# Patient Record
Sex: FEMALE | Race: WHITE
[De-identification: names, ages, dates, MRNs, and addresses within clinical notes are randomized per-mention and may not be internally consistent; named-entity substitution may affect disease eponyms.]

---

## 2022-01-10 ENCOUNTER — HOSPITAL ENCOUNTER (EMERGENCY)
Dept: HOSPITAL 63 - ER | Age: 57
LOS: 1 days | Discharge: HOME | End: 2022-01-11
Payer: COMMERCIAL

## 2022-01-10 VITALS — HEIGHT: 68 IN | BODY MASS INDEX: 25.13 KG/M2 | WEIGHT: 165.79 LBS

## 2022-01-10 DIAGNOSIS — E46: ICD-10-CM

## 2022-01-10 DIAGNOSIS — E87.6: ICD-10-CM

## 2022-01-10 DIAGNOSIS — D72.829: Primary | ICD-10-CM

## 2022-01-10 LAB
ALBUMIN SERPL-MCNC: 2.9 G/DL (ref 3.4–5)
ALP SERPL-CCNC: 65 U/L (ref 46–116)
ALT SERPL-CCNC: 17 U/L (ref 14–59)
AMPHETAMINE/METHAMPHETAMINE: (no result)
AMYLASE SERPL-CCNC: 26 U/L (ref 25–115)
ANION GAP SERPL CALC-SCNC: 9 MMOL/L (ref 6–14)
APTT PPP: YELLOW S
AST SERPL-CCNC: 11 U/L (ref 15–37)
BACTERIA #/AREA URNS HPF: 0 /HPF
BARBITURATES UR-MCNC: (no result) UG/ML
BASOPHILS # BLD AUTO: 0 X10^3/UL (ref 0–0.2)
BASOPHILS NFR BLD: 0 % (ref 0–3)
BENZODIAZ UR-MCNC: (no result) UG/L
BILIRUB DIRECT SERPL-MCNC: 0.1 MG/DL (ref 0–0.2)
BILIRUB SERPL-MCNC: 0.3 MG/DL (ref 0.2–1)
BILIRUB UR QL STRIP: (no result)
CA-I SERPL ISE-MCNC: 10 MG/DL (ref 7–20)
CALCIUM SERPL-MCNC: 8.6 MG/DL (ref 8.5–10.1)
CANNABINOIDS UR-MCNC: (no result) UG/L
CHLORIDE SERPL-SCNC: 99 MMOL/L (ref 98–107)
CO2 SERPL-SCNC: 26 MMOL/L (ref 21–32)
COCAINE UR-MCNC: (no result) NG/ML
CREAT SERPL-MCNC: 1 MG/DL (ref 0.6–1)
EOSINOPHIL NFR BLD: 0.1 X10^3/UL (ref 0–0.7)
EOSINOPHIL NFR BLD: 1 % (ref 0–3)
ERYTHROCYTE [DISTWIDTH] IN BLOOD BY AUTOMATED COUNT: 12.8 % (ref 11.5–14.5)
FIBRINOGEN PPP-MCNC: CLEAR MG/DL
GFR SERPLBLD BASED ON 1.73 SQ M-ARVRAT: 57.1 ML/MIN
GLUCOSE SERPL-MCNC: 107 MG/DL (ref 70–99)
GLUCOSE UR STRIP-MCNC: 100 MG/DL
HCT VFR BLD CALC: 38 % (ref 36–47)
HGB BLD-MCNC: 12.8 G/DL (ref 12–15.5)
LIPASE: 69 U/L (ref 73–393)
LYMPHOCYTES # BLD: 3.1 X10^3/UL (ref 1–4.8)
LYMPHOCYTES NFR BLD AUTO: 17 % (ref 24–48)
MCH RBC QN AUTO: 29 PG (ref 25–35)
MCHC RBC AUTO-ENTMCNC: 34 G/DL (ref 31–37)
MCV RBC AUTO: 86 FL (ref 79–100)
METHADONE SERPL-MCNC: (no result) NG/ML
MONO #: 1.2 X10^3/UL (ref 0–1.1)
MONOCYTES NFR BLD: 6 % (ref 0–9)
NEUT #: 14.2 X10^3UL (ref 1.8–7.7)
NEUTROPHILS NFR BLD AUTO: 76 % (ref 31–73)
NITRITE UR QL STRIP: (no result)
OPIATES UR-MCNC: (no result) NG/ML
PCP SERPL-MCNC: (no result) MG/DL
PLATELET # BLD AUTO: 380 X10^3/UL (ref 140–400)
POTASSIUM SERPL-SCNC: 3.1 MMOL/L (ref 3.5–5.1)
PROT SERPL-MCNC: 6.5 G/DL (ref 6.4–8.2)
RBC # BLD AUTO: 4.43 X10^6/UL (ref 3.5–5.4)
RBC #/AREA URNS HPF: (no result) /HPF (ref 0–2)
SODIUM SERPL-SCNC: 134 MMOL/L (ref 136–145)
SP GR UR STRIP: 1.01
SQUAMOUS #/AREA URNS LPF: (no result) /LPF
UROBILINOGEN UR-MCNC: 0.2 MG/DL
WBC # BLD AUTO: 18.7 X10^3/UL (ref 4–11)
WBC #/AREA URNS HPF: (no result) /HPF (ref 0–4)

## 2022-01-10 PROCEDURE — 96372 THER/PROPH/DIAG INJ SC/IM: CPT

## 2022-01-10 PROCEDURE — 83690 ASSAY OF LIPASE: CPT

## 2022-01-10 PROCEDURE — 80048 BASIC METABOLIC PNL TOTAL CA: CPT

## 2022-01-10 PROCEDURE — 82150 ASSAY OF AMYLASE: CPT

## 2022-01-10 PROCEDURE — 99285 EMERGENCY DEPT VISIT HI MDM: CPT

## 2022-01-10 PROCEDURE — 80076 HEPATIC FUNCTION PANEL: CPT

## 2022-01-10 PROCEDURE — 85007 BL SMEAR W/DIFF WBC COUNT: CPT

## 2022-01-10 PROCEDURE — 74022 RADEX COMPL AQT ABD SERIES: CPT

## 2022-01-10 PROCEDURE — 36415 COLL VENOUS BLD VENIPUNCTURE: CPT

## 2022-01-10 PROCEDURE — 80307 DRUG TEST PRSMV CHEM ANLYZR: CPT

## 2022-01-10 PROCEDURE — 96361 HYDRATE IV INFUSION ADD-ON: CPT

## 2022-01-10 PROCEDURE — 81001 URINALYSIS AUTO W/SCOPE: CPT

## 2022-01-10 PROCEDURE — 85025 COMPLETE CBC W/AUTO DIFF WBC: CPT

## 2022-01-10 PROCEDURE — 85730 THROMBOPLASTIN TIME PARTIAL: CPT

## 2022-01-10 PROCEDURE — 96375 TX/PRO/DX INJ NEW DRUG ADDON: CPT

## 2022-01-10 PROCEDURE — 84484 ASSAY OF TROPONIN QUANT: CPT

## 2022-01-10 PROCEDURE — 74177 CT ABD & PELVIS W/CONTRAST: CPT

## 2022-01-10 PROCEDURE — 96374 THER/PROPH/DIAG INJ IV PUSH: CPT

## 2022-01-10 PROCEDURE — 85610 PROTHROMBIN TIME: CPT

## 2022-01-10 NOTE — PHYS DOC
Past History


Past Medical History:  Other


Past Medical History


Ulcerative colitis


Past Surgical History:  No Surgical History


Smoking:  Non-smoker


Alcohol Use:  None


Drug Use:  None





General Adult


HPI:


HPI:


".. I ve got ulcerative colitis.. I been having some cramping..  I did go to the

urgent care  earlier this week end .. .. and got fluids..  and a steroid taper..

 I have a lot more bleeding today.. and cramping.."





Patient is a 56 year old female who presents with above hx and complaints of 

blood in stool.  Patient has long history of ulcerative colitis.  Has not had a 

colonoscopy in excessive 7 years.  Patient's last flare was .  Patient 

previously on Sulfasalazine.   Patient has not been on any suppressive meds.  No

recent travel.  No specific ill contacts.  Has gotten COVID vaccination.  Has 

not gotten a flu vaccination.  No recent travel.  No history of bad food intake.

 No history immunosuppression.





Review of Systems:


Review of Systems:


Constitutional:  Denies fever or chills 


Eyes:  Denies change in visual acuity 


HENT:  Denies nasal congestion or sore throat 


Respiratory:  Denies cough or shortness of breath 


Cardiovascular:  Denies chest pain or edema 


GI: Complains of abdominal pain, nausea, and some bright red blood in stools.  

Vomiting, bloody stools or diarrhea 


: Denies dysuria 


Musculoskeletal:  Denies back pain or joint pain 


Integument:  Denies rash 


Neurologic:  Denies headache, focal weakness or sensory changes 


Endocrine:  Denies polyuria or polydipsia 


Lymphatic:  Denies swollen glands 


Psychiatric:  Denies depression or anxiety





Family History:


Family History:


Noncontributory to presentation





Current Medications:


Current Meds:


See nursing for home meds





Allergies:


Allergies:





Allergies








Coded Allergies Type Severity Reaction Last Updated Verified


 


  No Known Drug Allergies    14 No











Physical Exam:


PE:





Constitutional: Moderate acute distress, non-toxic appearance. []


HENT: Normocephalic, atraumatic, bilateral external ears normal, oropharynx 

moist, no oral exudates, nose normal. []


Eyes: PERRLA, EOMI, conjunctiva normal, no discharge. [] 


Neck: Normal range of motion, no tenderness, supple, no stridor. [] 


Cardiovascular:Heart rate regular rhythm, no murmur []


Lungs & Thorax:  Bilateral breath sounds clear to auscultation []


Abdomen: Bowel sounds hyperactive, soft, generalized tenderness, no masses, no 

pulsatile masses. Generlized tenderness.


Skin: Warm, dry, no erythema, no rash. [] 


Back: No tenderness, no CVA tenderness. [] 


Extremities: No tenderness, no cyanosis, no clubbing, ROM intact, no edema. [] 


Neurologic: Alert and oriented X 3, normal motor function, normal sensory 

function, no focal deficits noted. []


Psychologic: Affect anxious, judgement normal, mood normal. []





EKG:


EKG:


[]





Radiology/Procedures:


Radiology/Procedures:


[[facility] [address], [place] [address]


                                 [phone]


                                        


                                 IMAGING REPORT





                                     Signed





PATIENT: EDA PEREZ   ACCOUNT: [id number]     MRN#: [id number]


: 1965           LOCATION: ER              AGE: 56


SEX: F                    EXAM DT: 01/10/22         ACCESSION#: [id number]


STATUS: REG ER            ORD. PHYSICIAN: BEBE YOUNG MD


REASON: UC, pain, bleeding Omni 300 75cc


PROCEDURE: CT ABD PELV W/ORAL&IV CONTRAST





CT ABDOMEN+PELVIS W





History: Reason: UC, pain, bleeding Omni 300 75cc / Spl. Instructions:  / 

History: 





Technique:  After the administration of intravenous contrast, CT imaging was 

performed of the abdomen and pelvis.  Multiplanar images are reviewed. 





Exposure: One or more of the following individualized dose reduction techniques 

were utilized for this examination:  


1. Automated exposure control  


2. Adjustment of the mA and/or kV according to patient size  


3. Use of iterative reconstruction technique.





Comparison:  May 5, 2014





Findings:


Lower chest: No consolidation or pleural effusion.





Abdomen and pelvis: Right hepatic lobe cyst measures 1.7 cm. The liver, adrenal 

glands, pancreas and gallbladder are unremarkable. No biliary ductal dilatation.

 Patent portal vein. No renal calculus. No hydronephrosis. Decompressed urinary 

bladder.





Multifocal colonic wall thickening moderate within the mid aspect and severe 

within the ascending colon. There is adjacent inflammatory changes. Normal 

appendix. No evidence of bowel obstruction. Oral contrast opacifies the distal 

small bowel. Small mesenteric and retroperitoneal lymph nodes. No ascites. No 

intra-abdominal abscess. No pneumatosis or pneumoperitoneum.





Bones: No pathologic osseous lesions.





Impression:


1.  Multifocal colonic wall thickening with adjacent inflammatory changes most 

prominent within the ascending colon, compatible with history of ulcerative 

colitis.








Electronically signed by: Caleb Echavarria DO (2022 1:29 AM) [facility]














DICTATED AND SIGNED BY:     CALEB ECHAVARRIA DO


DATE:     22





CC: BEBE YOUNG MD; PCP,NO ~MTH0 0


][facility] [address], [place] [address]


                                 [phone]


                                        


                                 IMAGING REPORT





                                     Signed





PATIENT: EDA PEREZ   ACCOUNT: [id number]     MRN#: [id number]


: 1965           LOCATION: ER              AGE: 56


SEX: F                    EXAM DT: 01/10/22         ACCESSION#: [id number]


STATUS: REG ER            ORD. PHYSICIAN: BEBE YOUNG MD


REASON: Severe abdomen pain


PROCEDURE: ACUTE ABDOMEN SERIES





XR ABDOMEN COMP ACUTE





History: Reason: Severe abdomen pain / Spl. Instructions:  / History: 





Technique: Upright and supine views the abdomen.





Comparison: None.





Findings:


No consolidation or pleural effusion. No pneumothorax. Normal heart size. No 

pneumoperitoneum. Mild small bowel gas. Air and stool scattered throughout the 

colon.





Impression: 


1.  Nonobstructed bowel gas pattern.





Electronically signed by: Caleb Echavarria DO (2022 12:13 AM) [facility]














DICTATED AND SIGNED BY:     CALEB ECHAVARRIA DO


DATE:     22





CC: BEBE YOUNG MD; PCP,NO ~MTH0 0





Heart Score:


C/O Chest Pain:  N/A


HEART Score for Chest Pain:  








HEART Score for Chest Pain Response (Comments) Value


 


History Slighlty/Non-Suspicious 0


 


ECG Normal 0


 


Age < 45 0


 


Risk Factors No Risk Factors 0


 


Troponin < Normal Limit 0


 


Total  0








Risk Factors:


Risk Factors:  DM, Current or recent (<one month) smoker, HTN, HLP, family 

history of CAD, obesity.


Risk Scores:


Score 0 - 3:  2.5% MACE over next 6 weeks - Discharge Home


Score 4 - 6:  20.3% MACE over next 6 weeks - Admit for Clinical Observation


Score 7 - 10:  72.7% MACE over next 6 weeks - Early Invasive Strategies





Course & Med Decision Making:


Course & Med Decision Making


Pertinent Labs and Imaging studies reviewed. (See chart for details)  





Pt. refused COVID and Flu swabs/





Clear fluid diet only x 48 hrs.  No solids or milk products .  Clear fluids 

only.   Take Meds as previously directed.   Follow up with GI.   Get colon 

scopic evaluation..  Start Apriso 0.375  qid.  Flagyl 500 three times a day.   

Cipro 500 twice a day x 5 days.   Zofran for active vomiting. Push simple fruit 

juices such as apple, grape, jello..ect.  Must follow up.   Get flu flu, COVID 

and Pneumovax vaccination went off the steroids





Impression:





1. Abdomen Pain


2. Hx. Ulcerative Colitis


3. [id number] 


4. Hypokalemia   3.1


5.  Malnutrition albumin 2.9





Dragon Disclaimer:


Dragon Disclaimer:


This electronic medical record was generated, in whole or in part, using a voice

recognition dictation system.





Departure


Departure:


Referrals:  


PCP,ANNIKA (PCP)


Scripts


Ondansetron (ONDANSETRON ODT) 8 Mg Tab.rapdis


8 MG PO QIDPRN for n/v, #30 TAB


   Prov: BEBE YOUNG MD         22 


Mesalamine (APRISO) 0.375 Gm Cap.er.24h


0.375 GM PO QID for Colitis for 30 Days, #120 CAP.SR


   Prov: BEBE YOUNG MD         22 


Metronidazole (FLAGYL) 375 Mg Capsule


500 MG PO TID for colitis for 10 Days, #40 CAP


   Prov: BEBE YOUNG MD         22 


Ciprofloxacin Hcl (CIPRO) 500 Mg Tablet


500 MG PO BID for colitis for 5 Days, #10 TAB


   Prov: BEBE YOUNG MD         22











BEBE YOUNG MD           Gideon 10, 2022 22:44

## 2022-01-11 VITALS — DIASTOLIC BLOOD PRESSURE: 58 MMHG | SYSTOLIC BLOOD PRESSURE: 112 MMHG

## 2022-01-11 LAB
% BANDS: 26 % (ref 0–9)
% LYMPHS: 20 % (ref 24–48)
% METAS: 4 % (ref 0–0)
% MONOS: 9 % (ref 0–10)
% SEGS: 41 % (ref 35–66)
PLATELET # BLD EST: ADEQUATE 10*3/UL

## 2022-01-11 NOTE — RAD
XR ABDOMEN COMP ACUTE



History: Reason: Severe abdomen pain / Spl. Instructions:  / History: 



Technique: Upright and supine views the abdomen.



Comparison: None.



Findings:

No consolidation or pleural effusion. No pneumothorax. Normal heart size. No pneumoperitoneum. Mild s
mall bowel gas. Air and stool scattered throughout the colon.



Impression: 

1.  Nonobstructed bowel gas pattern.



Electronically signed by: Caleb Guidry DO (1/11/2022 12:13 AM) [facility]

## 2022-01-11 NOTE — RAD
CT ABDOMEN+PELVIS W



History: Reason: UC, pain, bleeding Omni 300 75cc / Spl. Instructions:  / History: 



Technique:  After the administration of intravenous contrast, CT imaging was performed of the abdomen
 and pelvis.  Multiplanar images are reviewed. 



Exposure: One or more of the following individualized dose reduction techniques were utilized for thi
s examination:  

1. Automated exposure control  

2. Adjustment of the mA and/or kV according to patient size  

3. Use of iterative reconstruction technique.



Comparison:  May 5, 2014



Findings:

Lower chest: No consolidation or pleural effusion.



Abdomen and pelvis: Right hepatic lobe cyst measures 1.7 cm. The liver, adrenal glands, pancreas and 
gallbladder are unremarkable. No biliary ductal dilatation. Patent portal vein. No renal calculus. No
 hydronephrosis. Decompressed urinary bladder.



Multifocal colonic wall thickening moderate within the mid aspect and severe within the ascending col
on. There is adjacent inflammatory changes. Normal appendix. No evidence of bowel obstruction. Oral c
ontrast opacifies the distal small bowel. Small mesenteric and retroperitoneal lymph nodes. No ascite
s. No intra-abdominal abscess. No pneumatosis or pneumoperitoneum.



Bones: No pathologic osseous lesions.



Impression:

1.  Multifocal colonic wall thickening with adjacent inflammatory changes most prominent within the a
scending colon, compatible with history of ulcerative colitis.





Electronically signed by: Caleb Guidry DO (1/11/2022 1:29 AM) [facility]

## 2022-01-14 ENCOUNTER — HOSPITAL ENCOUNTER (EMERGENCY)
Dept: HOSPITAL 63 - ER | Age: 57
Discharge: HOME | End: 2022-01-14
Payer: COMMERCIAL

## 2022-01-14 VITALS — DIASTOLIC BLOOD PRESSURE: 64 MMHG | SYSTOLIC BLOOD PRESSURE: 116 MMHG

## 2022-01-14 VITALS — HEIGHT: 68 IN | WEIGHT: 162.04 LBS | BODY MASS INDEX: 24.56 KG/M2

## 2022-01-14 DIAGNOSIS — K51.90: Primary | ICD-10-CM

## 2022-01-14 DIAGNOSIS — D72.829: ICD-10-CM

## 2022-01-14 DIAGNOSIS — E46: ICD-10-CM

## 2022-01-14 DIAGNOSIS — E87.5: ICD-10-CM

## 2022-01-14 DIAGNOSIS — E86.0: ICD-10-CM

## 2022-01-14 LAB
% BANDS: 7 % (ref 0–9)
% LYMPHS: 19 % (ref 24–48)
% MONOS: 7 % (ref 0–10)
% SEGS: 66 % (ref 35–66)
ALBUMIN SERPL-MCNC: 2.8 G/DL (ref 3.4–5)
ALP SERPL-CCNC: 61 U/L (ref 46–116)
ALT SERPL-CCNC: 24 U/L (ref 14–59)
AMYLASE SERPL-CCNC: 27 U/L (ref 25–115)
ANION GAP SERPL CALC-SCNC: 12 MMOL/L (ref 6–14)
APTT PPP: YELLOW S
AST SERPL-CCNC: 20 U/L (ref 15–37)
BACTERIA #/AREA URNS HPF: (no result) /HPF
BASOPHILS # BLD AUTO: 0 X10^3/UL (ref 0–0.2)
BASOPHILS NFR BLD: 0 % (ref 0–3)
BILIRUB DIRECT SERPL-MCNC: 0.1 MG/DL (ref 0–0.2)
BILIRUB SERPL-MCNC: 0.4 MG/DL (ref 0.2–1)
BILIRUB UR QL STRIP: (no result)
CA-I SERPL ISE-MCNC: 7 MG/DL (ref 7–20)
CALCIUM SERPL-MCNC: 8.2 MG/DL (ref 8.5–10.1)
CHLORIDE SERPL-SCNC: 96 MMOL/L (ref 98–107)
CO2 SERPL-SCNC: 26 MMOL/L (ref 21–32)
CREAT SERPL-MCNC: 1.2 MG/DL (ref 0.6–1)
EOSINOPHIL NFR BLD AUTO: 1 % (ref 0–5)
EOSINOPHIL NFR BLD: 0.2 X10^3/UL (ref 0–0.7)
EOSINOPHIL NFR BLD: 1 % (ref 0–3)
ERYTHROCYTE [DISTWIDTH] IN BLOOD BY AUTOMATED COUNT: 13.1 % (ref 11.5–14.5)
FIBRINOGEN PPP-MCNC: CLEAR MG/DL
GFR SERPLBLD BASED ON 1.73 SQ M-ARVRAT: 46.3 ML/MIN
GLUCOSE SERPL-MCNC: 117 MG/DL (ref 70–99)
GLUCOSE UR STRIP-MCNC: 100 MG/DL
HCT VFR BLD CALC: 37.9 % (ref 36–47)
HGB BLD-MCNC: 12.7 G/DL (ref 12–15.5)
HYALINE CASTS #/AREA URNS LPF: (no result) /HPF
LIPASE: 79 U/L (ref 73–393)
LYMPHOCYTES # BLD: 2.5 X10^3/UL (ref 1–4.8)
LYMPHOCYTES NFR BLD AUTO: 10 % (ref 24–48)
MCH RBC QN AUTO: 29 PG (ref 25–35)
MCHC RBC AUTO-ENTMCNC: 34 G/DL (ref 31–37)
MCV RBC AUTO: 86 FL (ref 79–100)
MONO #: 0.7 X10^3/UL (ref 0–1.1)
MONOCYTES NFR BLD: 3 % (ref 0–9)
NEUT #: 22.5 X10^3UL (ref 1.8–7.7)
NEUTROPHILS NFR BLD AUTO: 87 % (ref 31–73)
NITRITE UR QL STRIP: (no result)
PLATELET # BLD AUTO: 336 X10^3/UL (ref 140–400)
PLATELET # BLD EST: ADEQUATE 10*3/UL
PLATELET CLUMP: PRESENT
POTASSIUM SERPL-SCNC: 2.5 MMOL/L (ref 3.5–5.1)
PROT SERPL-MCNC: 6.4 G/DL (ref 6.4–8.2)
RBC # BLD AUTO: 4.42 X10^6/UL (ref 3.5–5.4)
RBC #/AREA URNS HPF: (no result) /HPF (ref 0–2)
SODIUM SERPL-SCNC: 134 MMOL/L (ref 136–145)
SP GR UR STRIP: 1.02
SQUAMOUS #/AREA URNS LPF: (no result) /LPF
UROBILINOGEN UR-MCNC: 0.2 MG/DL
WBC # BLD AUTO: 25.9 X10^3/UL (ref 4–11)
WBC #/AREA URNS HPF: (no result) /HPF (ref 0–4)

## 2022-01-14 PROCEDURE — 85007 BL SMEAR W/DIFF WBC COUNT: CPT

## 2022-01-14 PROCEDURE — 87493 C DIFF AMPLIFIED PROBE: CPT

## 2022-01-14 PROCEDURE — 96375 TX/PRO/DX INJ NEW DRUG ADDON: CPT

## 2022-01-14 PROCEDURE — 36415 COLL VENOUS BLD VENIPUNCTURE: CPT

## 2022-01-14 PROCEDURE — 82550 ASSAY OF CK (CPK): CPT

## 2022-01-14 PROCEDURE — 85730 THROMBOPLASTIN TIME PARTIAL: CPT

## 2022-01-14 PROCEDURE — 93005 ELECTROCARDIOGRAM TRACING: CPT

## 2022-01-14 PROCEDURE — 96361 HYDRATE IV INFUSION ADD-ON: CPT

## 2022-01-14 PROCEDURE — 74022 RADEX COMPL AQT ABD SERIES: CPT

## 2022-01-14 PROCEDURE — 96376 TX/PRO/DX INJ SAME DRUG ADON: CPT

## 2022-01-14 PROCEDURE — 80048 BASIC METABOLIC PNL TOTAL CA: CPT

## 2022-01-14 PROCEDURE — 96372 THER/PROPH/DIAG INJ SC/IM: CPT

## 2022-01-14 PROCEDURE — 85025 COMPLETE CBC W/AUTO DIFF WBC: CPT

## 2022-01-14 PROCEDURE — 80076 HEPATIC FUNCTION PANEL: CPT

## 2022-01-14 PROCEDURE — 85610 PROTHROMBIN TIME: CPT

## 2022-01-14 PROCEDURE — 83690 ASSAY OF LIPASE: CPT

## 2022-01-14 PROCEDURE — 99284 EMERGENCY DEPT VISIT MOD MDM: CPT

## 2022-01-14 PROCEDURE — 82150 ASSAY OF AMYLASE: CPT

## 2022-01-14 PROCEDURE — 81001 URINALYSIS AUTO W/SCOPE: CPT

## 2022-01-14 PROCEDURE — 84484 ASSAY OF TROPONIN QUANT: CPT

## 2022-01-14 PROCEDURE — 87086 URINE CULTURE/COLONY COUNT: CPT

## 2022-01-14 PROCEDURE — 96374 THER/PROPH/DIAG INJ IV PUSH: CPT

## 2022-01-14 NOTE — RAD
XR ABDOMEN COMP ACUTE



History: Reason: nv, hx colitis / Spl. Instructions:  / History: 



Technique: Supine and upright view the abdomen.



Comparison: January 10, 2022



Findings:

No consolidation or pleural effusion. No pneumothorax. Normal heart size. No pneumoperitoneum. Mild s
mall bowel gas. Air and stool scattered throughout the colon. No air-fluid levels.



Impression: 

1.  Nonobstructed bowel gas pattern.



Electronically signed by: Caleb Guidry DO (1/14/2022 3:07 AM) [facility]

## 2022-01-14 NOTE — EKG
[facility] [address], [place] [address]

Test Date:    2022               Test Time:    03:01:11

Pat Name:     EDA PEREZ             Department:   

Patient ID:   [id number]           Room:          

Gender:       F                        Technician:   LALO

:          1965               Requested By: BEBE YOUNG

Order Number: [id number]            Reading MD:   Getachew Culver

                                 Measurements

Intervals                              Axis          

Rate:         71                       P:            39

TN:           166                      QRS:          24

QRSD:         80                       T:            33

QT:           386                                    

QTc:          420                                    

                           Interpretive Statements

SINUS RHYTHM

NORMAL ECG

RI6.02

No previous ECG available for comparison

Electronically Signed On 1- 15:53:27 CST by Getachew Culver

## 2022-01-14 NOTE — PHYS DOC
Past History


Past Medical History:  Other


Additional Past Medical Histor:  Ulcerative colitis


Past Surgical History:  No Surgical History


Smoking:  Non-smoker


Alcohol Use:  None


Drug Use:  None





General Adult


EDM:


Chief Complaint:  MULTIPLE COMPLAINTS





HPI:


HPI:


"..I am still having diarrhea..  I did not take the Flagyl or Cipro.. or 

Mesalamine..  I am still take the Prednisone..  and started Sulfasamine








Patient is a 56 year old female who presents with above hx and complaints 

abdomen pain and diarrhea.  Patient still on prednisone taper.  Has remained on 

clear fluid diet.  However is not taking Flagyl, Cipro or mesalamine.  Has plans

to start back on sulfasalamine.  Patient has known ulcerative colitis.  Presents

again tonight for IV hydration and electrolytes.  Patient states she did not 

take any of the meds prescribed because she is afraid that it would make her 

abdomen upset.





Review of Systems:


Review of Systems:


Constitutional:  Denies fever or chills 


Eyes:  Denies change in visual acuity 


HENT:  Denies nasal congestion or sore throat 


Respiratory:  Denies cough or shortness of breath 


Cardiovascular:  Denies chest pain or edema 


GI: Complains of abdominal pain, nausea, vomiting, complains of diarrhea 


: Denies dysuria 


Musculoskeletal:  Denies back pain or joint pain 


Integument:  Denies rash 


Neurologic:  Denies headache, focal weakness or sensory changes 


Endocrine:  Denies polyuria or polydipsia 


Lymphatic:  Denies swollen glands 


Psychiatric:  Denies depression or anxiety





Family History:


Family History:


Noncontributory to presentation





Current Medications:


Current Meds:


See nursing for home meds





Allergies:


Allergies:





Allergies








Coded Allergies Type Severity Reaction Last Updated Verified


 


  No Known Drug Allergies    14 No











Physical Exam:


PE:





Constitutional: Moderate acute distress, non-toxic appearance. []


HENT: Normocephalic, atraumatic, bilateral external ears normal, oropharynx 

dry,, no oral exudates, nose normal. []


Eyes: PERRLA, EOMI, conjunctiva normal, no discharge. [] 


Neck: Normal range of motion, no tenderness, supple, no stridor. [] 


Cardiovascular:Heart rate regular rhythm, no murmur [].  The bedside monitor 

shows a sinus rhythm.


Lungs & Thorax:  Bilateral breath sounds clear to auscultation []


Abdomen: Bowel sounds hyperactive, soft, generalized tenderness, no masses, no 

pulsatile masses. [] 


Skin: Warm, dry, no erythema, no rash. [] 


Back: No tenderness, no CVA tenderness. [] 


Extremities: No tenderness, no cyanosis, no clubbing, ROM intact, no edema.  No 

cording.  No Trousseau sign.


Neurologic: Alert and oriented X 3, normal motor function, normal sensory 

function, no focal deficits noted. []


Psychologic: Affect anxious, judgement normal, mood depressed.





EKG:


EKG:


My interpretation EKG shows a sinus rhythm at 71 bpm.  No acute morphology time 

of EKG is 301 hours [] No U waves noted on EKG.





Radiology/Procedures:


Radiology/Procedures:


[][facility] [address], [place] [address]


                                 [phone]


                                        


                                 IMAGING REPORT





                                     Signed





PATIENT: EDA PEREZ   ACCOUNT: [id number]     MRN#: [id number]


: 1965           LOCATION: ER              AGE: 56


SEX: F                    EXAM DT: 22         ACCESSION#: [id number]


STATUS: REG ER            ORD. PHYSICIAN: BEBE YOUNG MD


REASON: nv, hx colitis


PROCEDURE: ACUTE ABDOMEN SERIES





XR ABDOMEN COMP ACUTE





History: Reason: nv, hx colitis / Spl. Instructions:  / History: 





Technique: Supine and upright view the abdomen.





Comparison: January 10, 2022





Findings:


No consolidation or pleural effusion. No pneumothorax. Normal heart size. No 

pneumoperitoneum. Mild small bowel gas. Air and stool scattered throughout the 

colon. No air-fluid levels.





Impression: 


1.  Nonobstructed bowel gas pattern.





Electronically signed by: Caleb Guidry DO (2022 3:07 AM) [facility]














DICTATED AND SIGNED BY:     CALEB GUIDRY DO


DATE:     22 0305





CC: ILIANA BRISCOE MD; BEBE YOUNG MD ~MTH0 0





Heart Score:


C/O Chest Pain:  N/A


HEART Score for Chest Pain:  








HEART Score for Chest Pain Response (Comments) Value


 


History Slighlty/Non-Suspicious 0


 


ECG Normal 0


 


Age >45 - < 65 1


 


Risk Factors                            1 or 2 Risk Factors 1


 


Troponin < Normal Limit 0


 


Total  2








Risk Factors:


Risk Factors:  DM, Current or recent (<one month) smoker, HTN, HLP, family 

history of CAD, obesity.


Risk Scores:


Score 0 - 3:  2.5% MACE over next 6 weeks - Discharge Home


Score 4 - 6:  20.3% MACE over next 6 weeks - Admit for Clinical Observation


Score 7 - 10:  72.7% MACE over next 6 weeks - Early Invasive Strategies





Course & Med Decision Making:


Course & Med Decision Making


Pertinent Labs and Imaging studies reviewed. (See chart for details)





Patient given 2 L of LR.  Patient given Zofran IV x2.  Patient encouraged to 

stay on a clear fluid diet only for the next couple days.  No solids.  No milk 

products.  Again encourage patient to take antibiotics as directed.  Patient to 

push fluids particularly fluids high in potassium.  Patient's potassium was 

supplemented p.o.  Advised patient if further problems will need to follow the 

hospital has GI on call and possible admission.  Patient encouraged to take 

meds.  As directed.  We will add cholestyramine as a bulk agent for her stools. 

 Sample stool was sent for C. difficile.  Patient to follow-up pending culture 

for C. difficile.  The patient to follow-up GI.  Additional prescription for 

Zofran 8 mg up to 4 times a day.  Warned patient must get control of this 

ulcerative colitis.  If this continued to deteriorate she could have a 

perforation, GI bleed, or even need of colon resection.  Advised patient must 

follow with a hospital that has GI services such at [facility], [facility],.  If no

 improvement advised patient see will need to be admitted and placed on n.p.o. 

status.  With consult to GI.  Pt. at this time defers admission. 








Impression:





1.  Abdomen pain


2.  Known ulcerative colitis exacerbation


3.  Dehydration


4.  Critical hyperal kalemia 2.5


5.  Leukocytosis 25.9


6.  Malnutrition albumin 2.8





[]





Dragon Disclaimer:


Dragon Disclaimer:


This electronic medical record was generated, in whole or in part, using a voice

 recognition dictation system.





Departure


Departure:


Referrals:  


ILIANA BRISCOE MD (PCP)


Scripts


Ondansetron (ONDANSETRON ODT) 4 Mg Tab.rapdis


8 MG PO QIDPRN PRN for nv, #30 TAB


   Prov: BEBE YOUNG MD         22 


Ondansetron (ONDANSETRON ODT) 4 Mg Tab.rapdis


8 MG PO qidp for nv, #30 TAB


   Prov: BEBE YOUNG MD         22 


Cholestyramine (CHOLESTYRAMINE RESIN) 5 Gm Powder


5 GM MC TID for diarrhea for 30 Days, MISC


   Prov: BEBE YOUNG MD         22





Dragon Disclaimer


This chart was dictated in whole or in part using Voice Recognition software in 

a busy, high-work load, and often noisy Emergency Department environment.  It 

may contain unintended and wholly unrecognized errors or omissions.











BEBE YOUNG MD           2022 02:02

## 2022-01-16 ENCOUNTER — HOSPITAL ENCOUNTER (INPATIENT)
Dept: HOSPITAL 61 - ER | Age: 57
LOS: 2 days | Discharge: HOME | DRG: 387 | End: 2022-01-18
Attending: INTERNAL MEDICINE | Admitting: INTERNAL MEDICINE
Payer: COMMERCIAL

## 2022-01-16 VITALS — DIASTOLIC BLOOD PRESSURE: 64 MMHG | SYSTOLIC BLOOD PRESSURE: 110 MMHG

## 2022-01-16 VITALS — WEIGHT: 162.92 LBS | HEIGHT: 68 IN | BODY MASS INDEX: 24.69 KG/M2

## 2022-01-16 VITALS — SYSTOLIC BLOOD PRESSURE: 108 MMHG | DIASTOLIC BLOOD PRESSURE: 67 MMHG

## 2022-01-16 DIAGNOSIS — Z20.822: ICD-10-CM

## 2022-01-16 DIAGNOSIS — E87.6: ICD-10-CM

## 2022-01-16 DIAGNOSIS — E86.0: ICD-10-CM

## 2022-01-16 DIAGNOSIS — K51.90: Primary | ICD-10-CM

## 2022-01-16 DIAGNOSIS — D64.9: ICD-10-CM

## 2022-01-16 LAB
% BANDS: 12 % (ref 0–9)
% LYMPHS: 28 % (ref 24–48)
% MONOS: 1 % (ref 0–10)
% SEGS: 59 % (ref 35–66)
ALBUMIN SERPL-MCNC: 2.7 G/DL (ref 3.4–5)
ALBUMIN/GLOB SERPL: 0.6 {RATIO} (ref 1–1.7)
ALP SERPL-CCNC: 70 U/L (ref 46–116)
ALT SERPL-CCNC: 29 U/L (ref 14–59)
ANION GAP SERPL CALC-SCNC: 7 MMOL/L (ref 6–14)
AST SERPL-CCNC: 13 U/L (ref 15–37)
BASOPHILS # BLD AUTO: 0 X10^3/UL (ref 0–0.2)
BASOPHILS NFR BLD: 0 % (ref 0–3)
BILIRUB SERPL-MCNC: 0.4 MG/DL (ref 0.2–1)
BUN SERPL-MCNC: 8 MG/DL (ref 7–20)
BUN/CREAT SERPL: 7 (ref 6–20)
CALCIUM SERPL-MCNC: 8.2 MG/DL (ref 8.5–10.1)
CHLORIDE SERPL-SCNC: 99 MMOL/L (ref 98–107)
CO2 SERPL-SCNC: 30 MMOL/L (ref 21–32)
CREAT SERPL-MCNC: 1.1 MG/DL (ref 0.6–1)
EOSINOPHIL NFR BLD: 0 % (ref 0–3)
EOSINOPHIL NFR BLD: 0.1 X10^3/UL (ref 0–0.7)
ERYTHROCYTE [DISTWIDTH] IN BLOOD BY AUTOMATED COUNT: 13.2 % (ref 11.5–14.5)
GFR SERPLBLD BASED ON 1.73 SQ M-ARVRAT: 51.2 ML/MIN
GLUCOSE SERPL-MCNC: 135 MG/DL (ref 70–99)
HCT VFR BLD CALC: 39.1 % (ref 36–47)
HGB BLD-MCNC: 13.5 G/DL (ref 12–15.5)
LIPASE: 106 U/L (ref 73–393)
LYMPHOCYTES # BLD: 1.3 X10^3/UL (ref 1–4.8)
LYMPHOCYTES NFR BLD AUTO: 7 % (ref 24–48)
MAGNESIUM SERPL-MCNC: 2.1 MG/DL (ref 1.8–2.4)
MCH RBC QN AUTO: 29 PG (ref 25–35)
MCHC RBC AUTO-ENTMCNC: 34 G/DL (ref 31–37)
MCV RBC AUTO: 85 FL (ref 79–100)
MONO #: 0.7 X10^3/UL (ref 0–1.1)
MONOCYTES NFR BLD: 4 % (ref 0–9)
NEUT #: 17 X10^3/UL (ref 1.8–7.7)
NEUTROPHILS NFR BLD AUTO: 89 % (ref 31–73)
PLATELET # BLD AUTO: 375 X10^3/UL (ref 140–400)
PLATELET # BLD EST: ADEQUATE 10*3/UL
POTASSIUM SERPL-SCNC: 2.9 MMOL/L (ref 3.5–5.1)
PROT SERPL-MCNC: 7 G/DL (ref 6.4–8.2)
RBC # BLD AUTO: 4.6 X10^6/UL (ref 3.5–5.4)
SODIUM SERPL-SCNC: 136 MMOL/L (ref 136–145)
TOXIC GRANULES BLD QL SMEAR: (no result)
WBC # BLD AUTO: 19.1 X10^3/UL (ref 4–11)

## 2022-01-16 PROCEDURE — U0003 INFECTIOUS AGENT DETECTION BY NUCLEIC ACID (DNA OR RNA); SEVERE ACUTE RESPIRATORY SYNDROME CORONAVIRUS 2 (SARS-COV-2) (CORONAVIRUS DISEASE [COVID-19]), AMPLIFIED PROBE TECHNIQUE, MAKING USE OF HIGH THROUGHPUT TECHNOLOGIES AS DESCRIBED BY CMS-2020-01-R: HCPCS

## 2022-01-16 PROCEDURE — 84484 ASSAY OF TROPONIN QUANT: CPT

## 2022-01-16 PROCEDURE — 83690 ASSAY OF LIPASE: CPT

## 2022-01-16 PROCEDURE — 85007 BL SMEAR W/DIFF WBC COUNT: CPT

## 2022-01-16 PROCEDURE — 87493 C DIFF AMPLIFIED PROBE: CPT

## 2022-01-16 PROCEDURE — 90471 IMMUNIZATION ADMIN: CPT

## 2022-01-16 PROCEDURE — 90686 IIV4 VACC NO PRSV 0.5 ML IM: CPT

## 2022-01-16 PROCEDURE — 93005 ELECTROCARDIOGRAM TRACING: CPT

## 2022-01-16 PROCEDURE — 36415 COLL VENOUS BLD VENIPUNCTURE: CPT

## 2022-01-16 PROCEDURE — 80048 BASIC METABOLIC PNL TOTAL CA: CPT

## 2022-01-16 PROCEDURE — 87426 SARSCOV CORONAVIRUS AG IA: CPT

## 2022-01-16 PROCEDURE — 96368 THER/DIAG CONCURRENT INF: CPT

## 2022-01-16 PROCEDURE — 85025 COMPLETE CBC W/AUTO DIFF WBC: CPT

## 2022-01-16 PROCEDURE — 83550 IRON BINDING TEST: CPT

## 2022-01-16 PROCEDURE — 85027 COMPLETE CBC AUTOMATED: CPT

## 2022-01-16 PROCEDURE — 83631 LACTOFERRIN FECAL (QUANT): CPT

## 2022-01-16 PROCEDURE — 74018 RADEX ABDOMEN 1 VIEW: CPT

## 2022-01-16 PROCEDURE — 87505 NFCT AGENT DETECTION GI: CPT

## 2022-01-16 PROCEDURE — 80053 COMPREHEN METABOLIC PANEL: CPT

## 2022-01-16 PROCEDURE — G0378 HOSPITAL OBSERVATION PER HR: HCPCS

## 2022-01-16 PROCEDURE — 96365 THER/PROPH/DIAG IV INF INIT: CPT

## 2022-01-16 PROCEDURE — 96375 TX/PRO/DX INJ NEW DRUG ADDON: CPT

## 2022-01-16 PROCEDURE — 83735 ASSAY OF MAGNESIUM: CPT

## 2022-01-16 PROCEDURE — 82607 VITAMIN B-12: CPT

## 2022-01-16 PROCEDURE — 83540 ASSAY OF IRON: CPT

## 2022-01-16 RX ADMIN — ENOXAPARIN SODIUM SCH MG: 40 INJECTION SUBCUTANEOUS at 17:33

## 2022-01-16 RX ADMIN — POTASSIUM CHLORIDE SCH MLS/HR: 200 INJECTION, SOLUTION INTRAVENOUS at 13:05

## 2022-01-16 RX ADMIN — DEXTROSE, SODIUM CHLORIDE, AND POTASSIUM CHLORIDE SCH MLS/HR: 5; .45; .15 INJECTION INTRAVENOUS at 17:32

## 2022-01-16 RX ADMIN — POTASSIUM CHLORIDE SCH MLS/HR: 200 INJECTION, SOLUTION INTRAVENOUS at 15:13

## 2022-01-16 RX ADMIN — CIPROFLOXACIN SCH MLS/HR: 2 INJECTION, SOLUTION INTRAVENOUS at 21:00

## 2022-01-16 NOTE — RAD
PROCEDURE:  AP abdomen 1/16/2022 5:58 PM.



REASON FOR STUDY: Reason: abd pain / Spl. Instructions:  / History: .



COMPARISON: Radiographs of 1/14/2022 and CT of 1/11/2022.



FINDINGS: Gas is seen in the stomach and what may be a combination of large and small bowel. There is
 suggestion of some wall thickening of the transverse colon consistent with findings on CT. No bowel 
obstruction is seen. No abnormal masses or gas collections are apparent..



IMPRESSION: Colon wall thickening. No evidence of obstruction.



Electronically signed by: Favio Snyder Jr., MD (1/16/2022 6:00 PM) [facility]

## 2022-01-16 NOTE — PHYS DOC
General Adult


EDM:


Chief Complaint:  GI PROBLEM





HPI:


HPI:





Patient is a 56  year old female who presents with arriving at the emergency 

room with her  after being at [facility] on January 10,  and at

[facility] emergency room prior to those and diagnosed with colitis flare.  She 

has been given Flagyl and Cipro and sulfasalamine along with a prednisone taper.

 Patient is not compliant with this as she states it makes her vomit.  She 

states the ODT Zofran is also not working.  She states that she is now very 

weak, dizzy and still having abdominal pain.  She is not able to keep down 

fluids and continues to have constant watery diarrhea.  Patient is rating her 

pain 8 out of 10 at this time.  She is very tearful.  She states she recently 

lost her father and she has been very stressed.  Patient states she has not seen

a GI doctor in quite some many years because she has not had bad flares.  She 

denies blood in her vomit or stool, fever, syncope, chest pain, shortness of 

air, cough, headache.  She is vaccinated for COVID-19.





Review of Systems:


Review of Systems:


Constitutional:   Denies fever or chills. []


Eyes:   Denies change in visual acuity. []


HENT:   Denies nasal congestion or sore throat. [] 


Respiratory:   Denies cough or shortness of breath. [] 


Cardiovascular:   Denies chest pain or edema. [] 


GI:  + abdominal pain, +nausea, +vomiting, denies bloody stools or +diarrhea. []

 


:  Denies dysuria. [] 


Musculoskeletal:   Denies back pain or joint pain. + Generalized weakness [] 


Integument:   Denies rash. [] 


Neurologic:   Denies headache, focal weakness or sensory changes. [] 


Endocrine:   Denies polyuria or polydipsia. [] 


Lymphatic:  Denies swollen glands. [] 


Psychiatric:  Denies depression or anxiety. []





Heart Score:


C/O Chest Pain:  No





Current Medications:





Current Medications








 Medications


  (Trade)  Dose


 Ordered  Sig/Jacinto  Start Time


 Stop Time Status Last Admin


Dose Admin


 


 Ciprofloxacin/


 Dextrose  200 ml @ 


 200 mls/hr  1X  ONCE  22 12:00


 22 12:59 UNV  





 


 Fentanyl Citrate


  (Fentanyl 2ml


 Vial)  50 mcg  1X  ONCE  22 12:00


 22 12:01 UNV  





 


 Metronidazole  100 ml @ 


 100 mls/hr  1X  ONCE  22 12:00


 22 12:59 UNV  





 


 Prochlorperazine


 Edisylate


  (Compazine)  5 mg  1X  ONCE  22 12:00


 22 12:01 UNV  





 


 Sodium Chloride  1,000 ml @ 


 1,000 mls/hr  Q1H  22 12:00


 22 12:59 UNV  














Allergies:


Allergies:





Allergies








Coded Allergies Type Severity Reaction Last Updated Verified


 


  No Known Drug Allergies    22 No











Physical Exam:


PE:





Constitutional: Well developed, well nourished, no acute distress, non-toxic ap

pearance. []


HENT: Normocephalic, atraumatic, bilateral external ears normal, oropharynx 

moist, no oral exudates, nose normal. []


Eyes: PERRLA, EOMI, conjunctiva normal, no discharge. [] 


Neck: Normal range of motion, no tenderness, supple, no stridor. [] 


Cardiovascular:Heart rate regular rhythm, no murmur []


Lungs & Thorax:  Bilateral breath sounds clear to auscultation []


Abdomen: Bowel sounds normal, soft, bilateral lower tenderness, no masses, no 

pulsatile masses. [] 


Skin: Warm, dry, no erythema, no rash. [] 


Back: No tenderness, no CVA tenderness. [] 


Extremities: No tenderness, no cyanosis, no clubbing, ROM intact, no edema. [] 


Neurologic: Alert and oriented X 3, normal motor function, normal sensory 

function, no focal deficits noted. []


Psychologic: Affect normal, judgement normal, mood normal. []





EKG:


EK and read by Dr. Gonzalez as sinus rhythm and no STEMI





Radiology/Procedures:


Radiology/Procedures:


[]





Course & Med Decision Making:


Course & Med Decision Making


Pertinent Labs and Imaging studies reviewed. (See chart for details)





See HPI.  Alert and orientated x4.  Very tearful.  Speaks in full clear 

sentences.  Skin Pink warm and dry.  On  her C. difficile was 

negative.  Her potassium was low at 2.5 and was given p.o. potassium in the ED. 

 On  she was also given a prescription for cholestyramine.  Her white 

blood count at that time was also elevated at 25.9 which had gone up 

dramatically from January 10.  Patient agrees to admission as she is failing 

outpatient therapies.





Potassium replaced.  Patient started on Cipro and Flagyl.  Admitted to 

hospitalist.





[]





Sravan Disclaimer:


Dragon Disclaimer:


This electronic medical record was generated, in whole or in part, using a voice

 recognition dictation system.





Departure


Departure


Impression:  


   Primary Impression:  


   Colitis


   Additional Impressions:  


   Hypokalemia


   Intractable nausea and vomiting


Disposition:   ADMITTED AS INPATIENT


Admitting Physician:  HIMS


Condition:  STABLE











SCOTT FERGUSON APRN            2022 12:16

## 2022-01-16 NOTE — PDOC2
CONSULT


Date of Consult


Date of Consult


DATE: 1/16/22 


TIME: 17:26





Reason for Consult


Reason for Consult:


Diarrhea, abdominal pain, nausea and vomiting, history of ulcerative colitis





History of Present Illness


Reason for Visit:


This is a 56-year-old female whose had ulcerative colitis for approximately 25 

to 30 years.  She was first diagnosed by Dr. Patten with colonoscopy at that 

time.  She has been off and on medications for years with long periods of 

complete remission and absence of diarrhea or abdominal complaints.  She has had

several flares in the last few years but none of them severe until these recent 

weeks.  She thinks this was precipitated by a significant amount of family 

stress with her father and brother dying on the same day.  She denies any 

obvious infection history and went to several ERs in the last few weeks for 

treatment of her flares with diarrhea and nausea along with some bleeding last 

week.  She normally has very little bleeding with her bowel movements.  Even 

during the flares.  She also complains now of some lower abdominal cramping 

which is much more severe than it has been in in the past with flares.


She was sent home on antibiotics 1 of which was Flagyl and she thinks that may 

have made her nauseated.  She has a lot of nausea and some dry heaves which have

limited her ability to self hydrate.


She admits that she has not seen a GI physician for many years and has not had a

colonoscopy in some time.





Past Medical History


Cardiovascular:  No pertinent hx


Pulmonary:  No pertinent hx


GI:  Inflam bowel disease (Ulcerative colitis)


ENT:  No pertinent hx


Endocrine:  No pertinent hx


Dermatology:  No pertinent hx





Past Surgical History


Past Surgical History:  No pertinent history





Family History


Family History:  No Significant





Social History


No


ALCOHOL:  rare


Drugs:  None





Current Problem List


Problem List


Problems


Medical Problems:


(1) Colitis


Status: Acute  





(2) Hypokalemia


Status: Acute  





(3) Intractable nausea and vomiting


Status: Acute  











Current Medications


Current Medications





Current Medications


Sodium Chloride 1,000 ml @  1,000 mls/hr Q1H IV  Last administered on 1/16/22at 

13:10;  Start 1/16/22 at 12:00;  Stop 1/16/22 at 12:59;  Status DC


Fentanyl Citrate (Fentanyl 2ml Vial) 50 mcg 1X  ONCE IVP  Last administered on 

1/16/22at 12:43;  Start 1/16/22 at 12:00;  Stop 1/16/22 at 12:14;  Status DC


Prochlorperazine Edisylate (Compazine) 5 mg 1X  ONCE IVP  Last administered on 

1/16/22at 12:43;  Start 1/16/22 at 12:00;  Stop 1/16/22 at 12:14;  Status DC


Metronidazole 100 ml @  100 mls/hr 1X  ONCE IV  Last administered on 1/16/22at 

12:59;  Start 1/16/22 at 12:00;  Stop 1/16/22 at 12:59;  Status DC


Ciprofloxacin/ Dextrose 200 ml @  200 mls/hr 1X  ONCE IV  Last administered on 

1/16/22at 12:59;  Start 1/16/22 at 12:00;  Stop 1/16/22 at 12:59;  Status DC


Methylprednisolone Sodium Succinate (SOLU-Medrol 125MG VIAL) 125 mg 1X  ONCE IV 

Last administered on 1/16/22at 13:10;  Start 1/16/22 at 12:30;  Stop 1/16/22 at 

12:41;  Status DC


Potassium Chloride/Water 100 ml @  50 mls/hr Q2H IV  Last administered on 

1/16/22at 15:13;  Start 1/16/22 at 12:45;  Stop 1/16/22 at 16:44;  Status DC


Metronidazole 100 ml @  100 mls/hr Q8HRS IV ;  Start 1/16/22 at 22:00


Ciprofloxacin/ Dextrose 200 ml @  200 mls/hr Q12HR IV ;  Start 1/16/22 at 21:00


Potassium Chloride/Dextrose/ Sod Cl 1,000 ml @  125 mls/hr Q8H IV ;  Start 

1/16/22 at 16:30


Fentanyl Citrate (Fentanyl 2ml Vial) 50 mcg PRN Q2HR  PRN IVP MODERATE TO SEVERE

PAIN;  Start 1/16/22 at 16:45


Ondansetron HCl (Zofran) 4 mg PRN Q8HRS  PRN IVP NAUSEA/VOMITING;  Start 1/16/22

at 16:45


Influenza Virus Vaccine Quadrival (Flulaval Quad 4130-1194 Syringe) 0.5 ml ONCE 

ONCE VAX IM ;  Start 1/16/22 at 17:00;  Stop 1/16/22 at 17:09;  Status DC


Prednisone (Prednisone) 40 mg DAILY PO ;  Start 1/17/22 at 09:00


Enoxaparin Sodium (Lovenox Per Pharmacy Prophylaxis Dosing) 1 each PRN DAILY  

PRN MC SEE COMMENTS;  Start 1/16/22 at 17:00


Enoxaparin Sodium (Lovenox 40mg Syringe) 40 mg Q24H SQ ;  Start 1/16/22 at 18:00





Allergies


Allergies:  


Coded Allergies:  


     No Known Drug Allergies (Unverified , 1/16/22)





Physical Exam


General:  Alert, Oriented X3, Cooperative


HEENT:  Atraumatic


Lungs:  Clear to auscultation


Heart:  Regular rate, Normal S1, Normal S2


Abdomen:  Normal bowel sounds, Soft, No hepatosplenomegaly, No masses, Other 

(Minimal tenderness in the left lower quadrant.)


Extremities:  No clubbing, No cyanosis


Skin:  No rashes


Neuro:  Normal speech


Psych/Mental Status:  Mental status NL





Vitals


VITALS





Vital Signs








  Date Time  Temp Pulse Resp B/P (MAP) Pulse Ox O2 Delivery O2 Flow Rate FiO2


 


1/16/22 16:51      Room Air  


 


1/16/22 13:50  82  131/64 (86) 95   


 


1/16/22 12:43   18     


 


1/16/22 12:09 98.3       





 98.3       











Labs


Labs





Laboratory Tests








Test


 1/16/22


12:00 1/16/22


13:00


 


White Blood Count


 19.1 x10^3/uL


(4.0-11.0) 





 


Red Blood Count


 4.60 x10^6/uL


(3.50-5.40) 





 


Hemoglobin


 13.5 g/dL


(12.0-15.5) 





 


Hematocrit


 39.1 %


(36.0-47.0) 





 


Mean Corpuscular Volume 85 fL ()  


 


Mean Corpuscular Hemoglobin 29 pg (25-35)  


 


Mean Corpuscular Hemoglobin


Concent 34 g/dL


(31-37) 





 


Red Cell Distribution Width


 13.2 %


(11.5-14.5) 





 


Platelet Count


 375 x10^3/uL


(140-400) 





 


Neutrophils (%) (Auto) 89 % (31-73)  


 


Lymphocytes (%) (Auto) 7 % (24-48)  


 


Monocytes (%) (Auto) 4 % (0-9)  


 


Eosinophils (%) (Auto) 0 % (0-3)  


 


Basophils (%) (Auto) 0 % (0-3)  


 


Neutrophils # (Auto)


 17.0 x10^3/uL


(1.8-7.7) 





 


Lymphocytes # (Auto)


 1.3 x10^3/uL


(1.0-4.8) 





 


Monocytes # (Auto)


 0.7 x10^3/uL


(0.0-1.1) 





 


Eosinophils # (Auto)


 0.1 x10^3/uL


(0.0-0.7) 





 


Basophils # (Auto)


 0.0 x10^3/uL


(0.0-0.2) 





 


Segmented Neutrophils % 59 % (35-66)  


 


Band Neutrophils % 12 % (0-9)  


 


Lymphocytes % 28 % (24-48)  


 


Monocytes % 1 % (0-10)  


 


Toxic Granulation Mod  


 


Platelet Estimate


 Adequate


(ADEQUATE) 





 


Sodium Level


 136 mmol/L


(136-145) 





 


Potassium Level


 2.9 mmol/L


(3.5-5.1) 





 


Chloride Level


 99 mmol/L


() 





 


Carbon Dioxide Level


 30 mmol/L


(21-32) 





 


Anion Gap 7 (6-14)  


 


Blood Urea Nitrogen 8 mg/dL (7-20)  


 


Creatinine


 1.1 mg/dL


(0.6-1.0) 





 


Estimated GFR


(Cockcroft-Gault) 51.2 


 





 


BUN/Creatinine Ratio 7 (6-20)  


 


Glucose Level


 135 mg/dL


(70-99) 





 


Calcium Level


 8.2 mg/dL


(8.5-10.1) 





 


Magnesium Level


 2.1 mg/dL


(1.8-2.4) 





 


Total Bilirubin


 0.4 mg/dL


(0.2-1.0) 





 


Aspartate Amino Transf


(AST/SGOT) 13 U/L (15-37) 


 





 


Alanine Aminotransferase


(ALT/SGPT) 29 U/L (14-59) 


 





 


Alkaline Phosphatase


 70 U/L


() 





 


Troponin I High Sensitivity 16 ng/L (4-50)  


 


Total Protein


 7.0 g/dL


(6.4-8.2) 





 


Albumin


 2.7 g/dL


(3.4-5.0) 





 


Albumin/Globulin Ratio 0.6 (1.0-1.7)  


 


Lipase


 106 U/L


() 





 


SARS-CoV-2 Antigen (Rapid)


 


 Negative


(NEGATIVE)








Laboratory Tests








Test


 1/16/22


12:00 1/16/22


13:00


 


White Blood Count


 19.1 x10^3/uL


(4.0-11.0) 





 


Red Blood Count


 4.60 x10^6/uL


(3.50-5.40) 





 


Hemoglobin


 13.5 g/dL


(12.0-15.5) 





 


Hematocrit


 39.1 %


(36.0-47.0) 





 


Mean Corpuscular Volume 85 fL ()  


 


Mean Corpuscular Hemoglobin 29 pg (25-35)  


 


Mean Corpuscular Hemoglobin


Concent 34 g/dL


(31-37) 





 


Red Cell Distribution Width


 13.2 %


(11.5-14.5) 





 


Platelet Count


 375 x10^3/uL


(140-400) 





 


Neutrophils (%) (Auto) 89 % (31-73)  


 


Lymphocytes (%) (Auto) 7 % (24-48)  


 


Monocytes (%) (Auto) 4 % (0-9)  


 


Eosinophils (%) (Auto) 0 % (0-3)  


 


Basophils (%) (Auto) 0 % (0-3)  


 


Neutrophils # (Auto)


 17.0 x10^3/uL


(1.8-7.7) 





 


Lymphocytes # (Auto)


 1.3 x10^3/uL


(1.0-4.8) 





 


Monocytes # (Auto)


 0.7 x10^3/uL


(0.0-1.1) 





 


Eosinophils # (Auto)


 0.1 x10^3/uL


(0.0-0.7) 





 


Basophils # (Auto)


 0.0 x10^3/uL


(0.0-0.2) 





 


Segmented Neutrophils % 59 % (35-66)  


 


Band Neutrophils % 12 % (0-9)  


 


Lymphocytes % 28 % (24-48)  


 


Monocytes % 1 % (0-10)  


 


Toxic Granulation Mod  


 


Platelet Estimate


 Adequate


(ADEQUATE) 





 


Sodium Level


 136 mmol/L


(136-145) 





 


Potassium Level


 2.9 mmol/L


(3.5-5.1) 





 


Chloride Level


 99 mmol/L


() 





 


Carbon Dioxide Level


 30 mmol/L


(21-32) 





 


Anion Gap 7 (6-14)  


 


Blood Urea Nitrogen 8 mg/dL (7-20)  


 


Creatinine


 1.1 mg/dL


(0.6-1.0) 





 


Estimated GFR


(Cockcroft-Gault) 51.2 


 





 


BUN/Creatinine Ratio 7 (6-20)  


 


Glucose Level


 135 mg/dL


(70-99) 





 


Calcium Level


 8.2 mg/dL


(8.5-10.1) 





 


Magnesium Level


 2.1 mg/dL


(1.8-2.4) 





 


Total Bilirubin


 0.4 mg/dL


(0.2-1.0) 





 


Aspartate Amino Transf


(AST/SGOT) 13 U/L (15-37) 


 





 


Alanine Aminotransferase


(ALT/SGPT) 29 U/L (14-59) 


 





 


Alkaline Phosphatase


 70 U/L


() 





 


Troponin I High Sensitivity 16 ng/L (4-50)  


 


Total Protein


 7.0 g/dL


(6.4-8.2) 





 


Albumin


 2.7 g/dL


(3.4-5.0) 





 


Albumin/Globulin Ratio 0.6 (1.0-1.7)  


 


Lipase


 106 U/L


() 





 


SARS-CoV-2 Antigen (Rapid)


 


 Negative


(NEGATIVE)











Assessment/Plan


Assessment/Plan


Diarrhea with lower abdominal cramping, nausea and vomiting and some bleeding.  

All of the symptoms are very suspicious for a flare of her known ulcerative 

colitis.  This however is the most severe flare that she recalls ever having 

had.  We do not know what triggered this other than a lot of personal stress.  

We do not know whether she has an underlying infection such as C. difficile, 

enteric pathogen or viral infection but does not have COVID.  She has been seen 

in 2 ERs in the last 2 weeks but we do not know whether or not they tested for 

stool abnormalities.  We do know that she has failed outpatient management and 

was admitted because of dehydration, nausea and persistent symptoms in spite of 

steroids.


Her elevated white count could reflect underlying infection but also could be 

related to her steroids which she has been on now for several weeks.





History of ulcerative colitis.  It was diagnosed approximately 25-30 years ago 

but has been quite mild with only occasional flares and no chronic medical ther

apy until these recent events.  She admits she has not seen a GI physician in 

quite some time and is due for colonoscopy electively.





Plan: Agree with continued steroids and empiric antibiotics although as we 

exclude significant GI infections I would stop most of her antibiotics.


         Treat her nausea and vomiting and start with a clear liquid diet as 

tolerated


         Will check stools for enteric pathogens and C. difficile to exclude 

those as potential triggers for her flare.


         Will add dicyclomine for use for her spasms and crampy pain and 

continue to monitor.


         Once her symptoms improve, she should go home on a long steroid taper 

but started on mesalamine therapy.  Evaluation in the outpatient setting with 

adjustment of her meds and screening colonoscopy is also warranted in the near 

future











ANNE NEAL MD               Jan 16, 2022 17:32

## 2022-01-17 VITALS — DIASTOLIC BLOOD PRESSURE: 48 MMHG | SYSTOLIC BLOOD PRESSURE: 104 MMHG

## 2022-01-17 VITALS — DIASTOLIC BLOOD PRESSURE: 60 MMHG | SYSTOLIC BLOOD PRESSURE: 114 MMHG

## 2022-01-17 VITALS — SYSTOLIC BLOOD PRESSURE: 107 MMHG | DIASTOLIC BLOOD PRESSURE: 67 MMHG

## 2022-01-17 VITALS — SYSTOLIC BLOOD PRESSURE: 102 MMHG | DIASTOLIC BLOOD PRESSURE: 45 MMHG

## 2022-01-17 VITALS — DIASTOLIC BLOOD PRESSURE: 59 MMHG | SYSTOLIC BLOOD PRESSURE: 108 MMHG

## 2022-01-17 VITALS — SYSTOLIC BLOOD PRESSURE: 94 MMHG | DIASTOLIC BLOOD PRESSURE: 50 MMHG

## 2022-01-17 LAB
ANION GAP SERPL CALC-SCNC: 11 MMOL/L (ref 6–14)
BUN SERPL-MCNC: 4 MG/DL (ref 7–20)
CALCIUM SERPL-MCNC: 8 MG/DL (ref 8.5–10.1)
CHLORIDE SERPL-SCNC: 100 MMOL/L (ref 98–107)
CO2 SERPL-SCNC: 25 MMOL/L (ref 21–32)
CREAT SERPL-MCNC: 0.9 MG/DL (ref 0.6–1)
ERYTHROCYTE [DISTWIDTH] IN BLOOD BY AUTOMATED COUNT: 13 % (ref 11.5–14.5)
GFR SERPLBLD BASED ON 1.73 SQ M-ARVRAT: 64.5 ML/MIN
GLUCOSE SERPL-MCNC: 118 MG/DL (ref 70–99)
HCT VFR BLD CALC: 34.5 % (ref 36–47)
HGB BLD-MCNC: 11.6 G/DL (ref 12–15.5)
MCH RBC QN AUTO: 29 PG (ref 25–35)
MCHC RBC AUTO-ENTMCNC: 34 G/DL (ref 31–37)
MCV RBC AUTO: 86 FL (ref 79–100)
PLATELET # BLD AUTO: 278 X10^3/UL (ref 140–400)
POTASSIUM SERPL-SCNC: 3.4 MMOL/L (ref 3.5–5.1)
RBC # BLD AUTO: 4.01 X10^6/UL (ref 3.5–5.4)
SODIUM SERPL-SCNC: 136 MMOL/L (ref 136–145)
WBC # BLD AUTO: 11.8 X10^3/UL (ref 4–11)

## 2022-01-17 RX ADMIN — ENOXAPARIN SODIUM SCH MG: 40 INJECTION SUBCUTANEOUS at 16:58

## 2022-01-17 RX ADMIN — CIPROFLOXACIN SCH MLS/HR: 2 INJECTION, SOLUTION INTRAVENOUS at 10:28

## 2022-01-17 RX ADMIN — DEXTROSE, SODIUM CHLORIDE, AND POTASSIUM CHLORIDE SCH MLS/HR: 5; .45; .15 INJECTION INTRAVENOUS at 08:14

## 2022-01-17 RX ADMIN — DEXTROSE, SODIUM CHLORIDE, AND POTASSIUM CHLORIDE SCH MLS/HR: 5; .45; .15 INJECTION INTRAVENOUS at 16:57

## 2022-01-17 RX ADMIN — DEXTROSE, SODIUM CHLORIDE, AND POTASSIUM CHLORIDE SCH MLS/HR: 5; .45; .15 INJECTION INTRAVENOUS at 00:34

## 2022-01-17 RX ADMIN — CIPROFLOXACIN SCH MLS/HR: 2 INJECTION, SOLUTION INTRAVENOUS at 20:43

## 2022-01-17 NOTE — PDOC
Date of Service:


DATE: 1/17/22 


TIME: 09:46





Subjective:


Subjective:


Tolerating clear liquids w/o n/v.


Ongoing diarrhea but slowing some.  Less cramping as well.


No bleeding.


Feels hungry - baked potato sounds good.





Objective:


Objective:


Stool tests pending along w/ BMP.


Vital Signs:





                                   Vital Signs








  Date Time  Temp Pulse Resp B/P (MAP) Pulse Ox O2 Delivery O2 Flow Rate FiO2


 


1/17/22 08:19      Room Air  


 


1/17/22 07:00 98.1 66 16 107/67 (80) 97   





 98.1       








Labs:





Laboratory Tests








Test


 1/16/22


12:00 1/16/22


13:00 1/17/22


06:40


 


White Blood Count 19.1 x10^3/uL   11.8 x10^3/uL 


 


Red Blood Count 4.60 x10^6/uL   4.01 x10^6/uL 


 


Hemoglobin 13.5 g/dL   11.6 g/dL 


 


Hematocrit 39.1 %   34.5 % 


 


Mean Corpuscular Volume 85 fL   86 fL 


 


Mean Corpuscular Hemoglobin 29 pg   29 pg 


 


Mean Corpuscular Hemoglobin


Concent 34 g/dL 


 


 34 g/dL 





 


Red Cell Distribution Width 13.2 %   13.0 % 


 


Platelet Count 375 x10^3/uL   278 x10^3/uL 


 


Neutrophils (%) (Auto) 89 %   


 


Lymphocytes (%) (Auto) 7 %   


 


Monocytes (%) (Auto) 4 %   


 


Eosinophils (%) (Auto) 0 %   


 


Basophils (%) (Auto) 0 %   


 


Neutrophils # (Auto) 17.0 x10^3/uL   


 


Lymphocytes # (Auto) 1.3 x10^3/uL   


 


Monocytes # (Auto) 0.7 x10^3/uL   


 


Eosinophils # (Auto) 0.1 x10^3/uL   


 


Basophils # (Auto) 0.0 x10^3/uL   


 


Segmented Neutrophils % 59 %   


 


Band Neutrophils % 12 %   


 


Lymphocytes % 28 %   


 


Monocytes % 1 %   


 


Toxic Granulation Mod   


 


Platelet Estimate Adequate   


 


Sodium Level 136 mmol/L   


 


Potassium Level 2.9 mmol/L   


 


Chloride Level 99 mmol/L   


 


Carbon Dioxide Level 30 mmol/L   


 


Anion Gap 7   


 


Blood Urea Nitrogen 8 mg/dL   


 


Creatinine 1.1 mg/dL   


 


Estimated GFR


(Cockcroft-Gault) 51.2 


 


 





 


BUN/Creatinine Ratio 7   


 


Glucose Level 135 mg/dL   


 


Calcium Level 8.2 mg/dL   


 


Magnesium Level 2.1 mg/dL   


 


Total Bilirubin 0.4 mg/dL   


 


Aspartate Amino Transf


(AST/SGOT) 13 U/L 


 


 





 


Alanine Aminotransferase


(ALT/SGPT) 29 U/L 


 


 





 


Alkaline Phosphatase 70 U/L   


 


Troponin I High Sensitivity 16 ng/L   


 


Total Protein 7.0 g/dL   


 


Albumin 2.7 g/dL   


 


Albumin/Globulin Ratio 0.6   


 


Lipase 106 U/L   


 


SARS-CoV-2 Antigen (Rapid)  Negative  








Imaging:


KUB 1/16


IMPRESSION: Colon wall thickening. No evidence of obstruction.





PE:





GEN: NAD


LUNGS: CTAB


HEART: RRR


ABD: BS+, soft, non-distended, non-tender


NEURO/PSYCH: A & O 3





A/P:


Diarrhea and lower abdominal cramping - better


H/o UC recently on steroids


Leukocytosis (better), mild anemia, hypokalemia





--


Okay to advance diet per GI - she requests a baked potato - d/w nurse.


Otherwise continue same for now, await pending labs.  Check anemia parameters 

for completeness.


Plan for outpt colonoscopy.





Justicifation of Admission Dx:


Justifications for Admission:


Justification of Admission Dx:  Yes











MADI FRIEND         Jan 17, 2022 09:51

## 2022-01-17 NOTE — NUR
SW following. Discussed with RN, pt from home, room air, GI soft diet, ad harmeet, IV abx. GI 
following. Rapid COVID-19 negative, PCR pending. SW will continue to follow.

## 2022-01-17 NOTE — EKG
[facility]

              [address] [place] [address]

Test Date:    2022               Test Time:    12:11:25

Pat Name:     DURAN CAICEDO             Department:   

Patient ID:   [id number]           Room:          

Gender:       F                        Technician:   

:          [id number]               Requested By: SCOTT FERGUSON

Order Number: [id number]           Reading MD:     

                                 Measurements

Intervals                              Axis          

Rate:         85                       P:            107

NY:           156                      QRS:          24

QRSD:         80                       T:            39

QT:           370                                    

QTc:          446                                    

                           Interpretive Statements

SINUS RHYTHM

INTERPOLATED VENTRICULAR PREMATURE COMPLEX(ES)

ABNORMAL ECG

RI6.02

No previous ECG available for comparison

## 2022-01-18 VITALS — SYSTOLIC BLOOD PRESSURE: 112 MMHG | DIASTOLIC BLOOD PRESSURE: 55 MMHG

## 2022-01-18 VITALS — SYSTOLIC BLOOD PRESSURE: 108 MMHG | DIASTOLIC BLOOD PRESSURE: 54 MMHG

## 2022-01-18 VITALS — SYSTOLIC BLOOD PRESSURE: 110 MMHG | DIASTOLIC BLOOD PRESSURE: 60 MMHG

## 2022-01-18 VITALS — SYSTOLIC BLOOD PRESSURE: 106 MMHG | DIASTOLIC BLOOD PRESSURE: 62 MMHG

## 2022-01-18 RX ADMIN — ENOXAPARIN SODIUM SCH MG: 40 INJECTION SUBCUTANEOUS at 17:15

## 2022-01-18 RX ADMIN — DEXTROSE, SODIUM CHLORIDE, AND POTASSIUM CHLORIDE SCH MLS/HR: 5; .45; .15 INJECTION INTRAVENOUS at 16:15

## 2022-01-18 RX ADMIN — DEXTROSE, SODIUM CHLORIDE, AND POTASSIUM CHLORIDE SCH MLS/HR: 5; .45; .15 INJECTION INTRAVENOUS at 00:27

## 2022-01-18 RX ADMIN — CIPROFLOXACIN SCH MLS/HR: 2 INJECTION, SOLUTION INTRAVENOUS at 08:55

## 2022-01-18 RX ADMIN — DEXTROSE, SODIUM CHLORIDE, AND POTASSIUM CHLORIDE SCH MLS/HR: 5; .45; .15 INJECTION INTRAVENOUS at 08:29

## 2022-01-18 NOTE — PDOC
Date of Service:


DATE: 1/18/22 


TIME: 09:44





Subjective:


Subjective:


Tolerating diet.  Little bit of lower abd cramping.  Ongoing watery stools - 

four overnight into this morning.  No bleeding.





Objective:


Objective:


Stool tests still pending.


Vital Signs:





                                   Vital Signs








  Date Time  Temp Pulse Resp B/P (MAP) Pulse Ox O2 Delivery O2 Flow Rate FiO2


 


1/18/22 08:30      Room Air  


 


1/18/22 07:00 98.3 71 16 110/60 (77) 97   





 98.3       











PE:





GEN: NAD


LUNGS: CTAB


HEART: RRR


ABD: fairly quiet, soft, non-tender, non-distended


NEURO/PSYCH: A & O 3





A/P:


Suspected UC flare





--


Still awaiting stool tests.


If negative, stop antibiotics, continue steroid taper, DC soon, and plan for 

outpt colonoscopy.





****


Update:


C Diff and enteric panel negative this afternoon.


Stop antibiotics, consider DC soon on slow prednisone taper.


Follow-up for colonoscopy - our office will call to arrange.





Justicifation of Admission Dx:


Justifications for Admission:


Justification of Admission Dx:  Yes











MADI FRIEND         Jan 18, 2022 09:47

## 2022-01-18 NOTE — PDOC
[facility] PROGRESS NOTE


Date of Service


DOS:


1/17 late entry





Chief Complaint


Chief Complaint


Assessment/Plan


ulcerative colitis flare, 


IV steroids given in Er,  will return to normal 40 prednisone


IV abx,  IV fluid


hypkalemia, IV given,  replace with d5 half 20


clear liquids as able





History of Present Illness


History of Present Illness


1/17


eval and examined at bedside.  continue current gi following. can hopefully d/c 

in morning.





Vitals/I&O


Vitals/I&O:





                                   Vital Signs








  Date Time  Temp Pulse Resp B/P (MAP) Pulse Ox O2 Delivery O2 Flow Rate FiO2


 


1/17/22 19:39      Room Air  


 


1/17/22 19:00 98.6 78 18 108/59 (75) 96   





 98.6       














                                    I & O   


 


 1/17/22 1/17/22 1/18/22





 15:00 23:00 07:00


 


Intake Total 440 ml 1640 ml 


 


Balance 440 ml 1640 ml 











Physical Exam


General:  Alert, Oriented X3, Cooperative


Heart:  Regular rate, Normal S1, Normal S2


Abdomen:  Normal bowel sounds, Soft, No hepatosplenomegaly, No masses, Other 

(Minimal tenderness in the left lower quadrant.)


Extremities:  No clubbing, No cyanosis


Skin:  No rashes





Labs


Labs:





Laboratory Tests








Test


 1/17/22


06:40


 


White Blood Count


 11.8 x10^3/uL


(4.0-11.0)


 


Red Blood Count


 4.01 x10^6/uL


(3.50-5.40)


 


Hemoglobin


 11.6 g/dL


(12.0-15.5)


 


Hematocrit


 34.5 %


(36.0-47.0)


 


Mean Corpuscular Volume 86 fL () 


 


Mean Corpuscular Hemoglobin 29 pg (25-35) 


 


Mean Corpuscular Hemoglobin


Concent 34 g/dL


(31-37)


 


Red Cell Distribution Width


 13.0 %


(11.5-14.5)


 


Platelet Count


 278 x10^3/uL


(140-400)


 


Sodium Level


 136 mmol/L


(136-145)


 


Potassium Level


 3.4 mmol/L


(3.5-5.1)


 


Chloride Level


 100 mmol/L


()


 


Carbon Dioxide Level


 25 mmol/L


(21-32)


 


Anion Gap 11 (6-14) 


 


Blood Urea Nitrogen 4 mg/dL (7-20) 


 


Creatinine


 0.9 mg/dL


(0.6-1.0)


 


Estimated GFR


(Cockcroft-Gault) 64.5 





 


Glucose Level


 118 mg/dL


(70-99)


 


Calcium Level


 8.0 mg/dL


(8.5-10.1)


 


Iron Level


 89 ug/dL


()


 


Total Iron Binding Capacity


 139 ug/dL


(250-450)


 


Iron Saturation 64 % (15-34) 


 


Vitamin B12 Level


 1971 pg/mL


(247-911)











Assessment and Plan


Assessmemt and Plan


Problems


Medical Problems:


(1) Colitis


Status: Acute  





(2) Hypokalemia


Status: Acute  





(3) Intractable nausea and vomiting


Status: Acute  











Comment


Review of Relevant


I have reviewed the following items kvng (where applicable) has been applied.


Medications:





Current Medications








 Medications


  (Trade)  Dose


 Ordered  Sig/Jacinto


 Route


 PRN Reason  Start Time


 Stop Time Status Last Admin


Dose Admin


 


 Prednisone


  (Prednisone)  40 mg  DAILY


 PO


   1/17/22 09:00


    1/17/22 10:28














Justifications for Admission


Other Justification














BEKAH PANDEY MD         Jan 18, 2022 00:07

## 2022-01-18 NOTE — NUR
Pt left unit at 1835 by wheelchair via private vehicle. Pt's IV removed without 
complication, VSS. Discharge paperwork discussed and sent with pt.

## 2022-01-18 NOTE — NUR
IV fluids nonadministered by this RN. Previous bag still infusing. Refer to [facility] for 
additional details.

## 2022-01-18 NOTE — NUR
SS following up with discharge planning. SS reviewed pt chart and discussed with pt RN. Pt 
is currently on room air. COVID19 negative. Pt on IV Ciprofloxacin and Flagyl. GI following. 
Stool sample pending. SS will continue to follow for discharge planning.